# Patient Record
(demographics unavailable — no encounter records)

---

## 2025-02-27 NOTE — ASSESSMENT
[FreeTextEntry1] : 57M w/ T2DM, HLD, GERD is coming in to establish care.   #HTN -Repeat manual BP still above goal -Will recommend lifestyle modification for now - cut down on ETOH, Diet and exercise, low salt diet -Monitor off meds -Reassess in 3 months   #T2DM -A1C within goal  -Patient wants to monitor off meds, recommend lifestyle modification -Annual ophtho and podiatry visits -Check A1C and Urine Alb/Cr in 3 months   #GERD -Discussed lifestyle and dietary modification -Start Omeprazole 40mg qd -Reassess in 3 months   RTC in 3 months

## 2025-02-27 NOTE — PHYSICAL EXAM
[Normal] : affect was normal and insight and judgment were intact [No Respiratory Distress] : no respiratory distress  [No Accessory Muscle Use] : no accessory muscle use [No Focal Deficits] : no focal deficits

## 2025-02-27 NOTE — HEALTH RISK ASSESSMENT
[Yes] : Yes [1 or 2 (0 pts)] : 1 or 2 (0 points) [Never (0 pts)] : Never (0 points) [No] : In the past 12 months have you used drugs other than those required for medical reasons? No [Never] : Never [2 - 4 times a month (2 pts)] : 2-4 times a month (2 points) [Audit-CScore] : 2

## 2025-02-27 NOTE — HISTORY OF PRESENT ILLNESS
[FreeTextEntry8] : 57M w/ T2DM, HLD, GERD is coming in to establish care.   Feels well, no acute complaints. Previous PCP retired. Has recent bw. A1C of 6.5, LDL of 134. BPs have been high recently.   November 2024 had colonoscopy, recommended for repeat in 5 years  Has had more acid reflux recently that is not well controlled.

## 2025-05-09 NOTE — PHYSICAL EXAM
[de-identified] : Hallux valgus present b/l [FreeTextEntry1] : Small excoriation left shin, fully healed no clinical signs of infection. 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  [Sensation] : the sensory exam was normal to light touch and pinprick [Deep Tendon Reflexes (DTR)] : deep tendon reflexes were 2+ and symmetric [Motor Exam] : the motor exam was normal [Vibration Dec.] : normal vibratory sensation at the level of the toes [Position Sense Dec.] : normal position sense at the level of the toes [Diminished Throughout Right Foot] : normal sensation with monofilament testing throughout right foot [Diminished Throughout Left Foot] : normal sensation with monofilament testing throughout left foot [Oriented To Time, Place, And Person] : oriented to person, place, and time [Impaired Insight] : insight and judgment were intact [Affect] : the affect was normal [Mood] : the mood was normal [Memory Recent] : recent memory was not impaired [Memory Remote] : remote memory was not impaired

## 2025-05-09 NOTE — HISTORY OF PRESENT ILLNESS
[FreeTextEntry1] : 57yM presents for evaluation of the lower extremity in the setting of Type 2 Diabetes Mellitus. Patient denies history of burning, tingling, numbness as well as balance issues. History of peripheral arterial disease (PAD) is not present.    Patient has NO prior history of amputations, ulcerations or any serious limb threatening infections.  Patient does admit small excoriation left shin, though he says this happens a lot from heat. Denies n/v/c/f/sob.  Last A1C: 6.2% Last PCP/endocrinologist visit: 3 months ago

## 2025-05-09 NOTE — ASSESSMENT
[FreeTextEntry1] : Patient presents for lower extremity evaluation in the settings of Diabetes. Exam and evaluation were performed. Discussed the effects of diabetes on the feet.  Patient educated on the effects of PAD and Neuropathy. Diabetic education provided highlighting the need for daily foot checks, ulcer prevention, and the use of accommodating shoes. Slight linear hyperpigmentation of RIGHT hallux nail. If continues in 12 weeks, may obtain nail biopsy and subsequent skin biopsy.  I counseled the patient regarding the following: Skin Care: The patient was instructed to apply keratolytic agents such as Amlactin, Duofilm or Mediplast. Expectations: Callus result from frictional rubbing. Calluses respond well if frictional rubbing is discontinued. Contact office if: Calluses fail to improve despite months of treatment. OTC Recommendations: Patient Specific OTC Recommendation: Topical Urea All questions were answered to the patient's satisfaction and understanding. The patient will return in 10-12 weeks for follow up.

## 2025-06-18 NOTE — PHYSICAL EXAM
[No Respiratory Distress] : no respiratory distress  [No Accessory Muscle Use] : no accessory muscle use [Clear to Auscultation] : lungs were clear to auscultation bilaterally [Normal Rate] : normal rate  [Regular Rhythm] : with a regular rhythm [No Edema] : there was no peripheral edema [Normal] : affect was normal and insight and judgment were intact